# Patient Record
Sex: FEMALE | Race: WHITE | Employment: OTHER | URBAN - METROPOLITAN AREA
[De-identification: names, ages, dates, MRNs, and addresses within clinical notes are randomized per-mention and may not be internally consistent; named-entity substitution may affect disease eponyms.]

---

## 2017-11-13 ENCOUNTER — HOSPITAL ENCOUNTER (OUTPATIENT)
Dept: MAMMOGRAPHY | Age: 67
Discharge: HOME | End: 2017-11-13
Payer: MEDICARE

## 2017-11-13 DIAGNOSIS — Z12.31 ENCOUNTER FOR SCREENING MAMMOGRAM FOR BREAST CANCER: ICD-10-CM

## 2017-11-13 DIAGNOSIS — Z78.0 POST-MENOPAUSAL: ICD-10-CM

## 2017-11-13 PROCEDURE — 77080 DXA BONE DENSITY AXIAL: CPT

## 2017-11-13 PROCEDURE — 77063 BREAST TOMOSYNTHESIS BI: CPT

## 2018-11-14 ENCOUNTER — HOSPITAL ENCOUNTER (OUTPATIENT)
Dept: MAMMOGRAPHY | Age: 68
Discharge: HOME | End: 2018-11-16
Payer: COMMERCIAL

## 2018-11-14 DIAGNOSIS — Z12.31 ENCOUNTER FOR SCREENING MAMMOGRAM FOR BREAST CANCER: ICD-10-CM

## 2018-11-14 PROCEDURE — 77063 BREAST TOMOSYNTHESIS BI: CPT

## 2018-11-28 ENCOUNTER — OFFICE VISIT (OUTPATIENT)
Dept: ORTHOPEDIC SURGERY | Age: 68
End: 2018-11-28
Payer: COMMERCIAL

## 2018-11-28 VITALS — WEIGHT: 140.6 LBS | HEIGHT: 61 IN | BODY MASS INDEX: 26.55 KG/M2

## 2018-11-28 DIAGNOSIS — M47.12 CERVICAL ARTHRITIS WITH MYELOPATHY: Primary | ICD-10-CM

## 2018-11-28 DIAGNOSIS — R26.81 GAIT INSTABILITY: ICD-10-CM

## 2018-11-28 PROCEDURE — 3017F COLORECTAL CA SCREEN DOC REV: CPT | Performed by: STUDENT IN AN ORGANIZED HEALTH CARE EDUCATION/TRAINING PROGRAM

## 2018-11-28 PROCEDURE — 1101F PT FALLS ASSESS-DOCD LE1/YR: CPT | Performed by: STUDENT IN AN ORGANIZED HEALTH CARE EDUCATION/TRAINING PROGRAM

## 2018-11-28 PROCEDURE — 1090F PRES/ABSN URINE INCON ASSESS: CPT | Performed by: STUDENT IN AN ORGANIZED HEALTH CARE EDUCATION/TRAINING PROGRAM

## 2018-11-28 PROCEDURE — 4040F PNEUMOC VAC/ADMIN/RCVD: CPT | Performed by: STUDENT IN AN ORGANIZED HEALTH CARE EDUCATION/TRAINING PROGRAM

## 2018-11-28 PROCEDURE — G8419 CALC BMI OUT NRM PARAM NOF/U: HCPCS | Performed by: STUDENT IN AN ORGANIZED HEALTH CARE EDUCATION/TRAINING PROGRAM

## 2018-11-28 PROCEDURE — 1123F ACP DISCUSS/DSCN MKR DOCD: CPT | Performed by: STUDENT IN AN ORGANIZED HEALTH CARE EDUCATION/TRAINING PROGRAM

## 2018-11-28 PROCEDURE — G8484 FLU IMMUNIZE NO ADMIN: HCPCS | Performed by: STUDENT IN AN ORGANIZED HEALTH CARE EDUCATION/TRAINING PROGRAM

## 2018-11-28 PROCEDURE — 1036F TOBACCO NON-USER: CPT | Performed by: STUDENT IN AN ORGANIZED HEALTH CARE EDUCATION/TRAINING PROGRAM

## 2018-11-28 PROCEDURE — G8399 PT W/DXA RESULTS DOCUMENT: HCPCS | Performed by: STUDENT IN AN ORGANIZED HEALTH CARE EDUCATION/TRAINING PROGRAM

## 2018-11-28 PROCEDURE — G8427 DOCREV CUR MEDS BY ELIG CLIN: HCPCS | Performed by: STUDENT IN AN ORGANIZED HEALTH CARE EDUCATION/TRAINING PROGRAM

## 2018-11-28 PROCEDURE — 99204 OFFICE O/P NEW MOD 45 MIN: CPT | Performed by: STUDENT IN AN ORGANIZED HEALTH CARE EDUCATION/TRAINING PROGRAM

## 2018-11-28 RX ORDER — MAGNESIUM OXIDE 400 MG/1
400 TABLET ORAL DAILY
COMMUNITY

## 2018-11-28 RX ORDER — ATENOLOL 50 MG/1
50 TABLET ORAL DAILY
COMMUNITY

## 2018-11-28 RX ORDER — LISINOPRIL 2.5 MG/1
2.5 TABLET ORAL DAILY
COMMUNITY

## 2018-11-28 RX ORDER — ATORVASTATIN CALCIUM 40 MG/1
40 TABLET, FILM COATED ORAL DAILY
COMMUNITY

## 2018-11-28 ASSESSMENT — ENCOUNTER SYMPTOMS
CHEST TIGHTNESS: 0
DIARRHEA: 0
EYE DISCHARGE: 0
CHOKING: 0
NAUSEA: 0
ABDOMINAL PAIN: 0
VOMITING: 0
WHEEZING: 0

## 2018-12-04 ENCOUNTER — TELEPHONE (OUTPATIENT)
Dept: ORTHOPEDIC SURGERY | Age: 68
End: 2018-12-04

## 2018-12-07 ENCOUNTER — HOSPITAL ENCOUNTER (OUTPATIENT)
Dept: MRI IMAGING | Age: 68
Discharge: HOME | End: 2018-12-09
Payer: COMMERCIAL

## 2018-12-07 DIAGNOSIS — M47.12 CERVICAL ARTHRITIS WITH MYELOPATHY: ICD-10-CM

## 2018-12-07 PROCEDURE — 72141 MRI NECK SPINE W/O DYE: CPT

## 2018-12-26 ENCOUNTER — OFFICE VISIT (OUTPATIENT)
Dept: ORTHOPEDIC SURGERY | Age: 68
End: 2018-12-26
Payer: COMMERCIAL

## 2018-12-26 VITALS — BODY MASS INDEX: 26.56 KG/M2 | WEIGHT: 140.65 LBS | HEIGHT: 61 IN

## 2018-12-26 DIAGNOSIS — M47.12 CERVICAL ARTHRITIS WITH MYELOPATHY: Primary | ICD-10-CM

## 2018-12-26 PROCEDURE — G8484 FLU IMMUNIZE NO ADMIN: HCPCS | Performed by: STUDENT IN AN ORGANIZED HEALTH CARE EDUCATION/TRAINING PROGRAM

## 2018-12-26 PROCEDURE — G8419 CALC BMI OUT NRM PARAM NOF/U: HCPCS | Performed by: STUDENT IN AN ORGANIZED HEALTH CARE EDUCATION/TRAINING PROGRAM

## 2018-12-26 PROCEDURE — 1036F TOBACCO NON-USER: CPT | Performed by: STUDENT IN AN ORGANIZED HEALTH CARE EDUCATION/TRAINING PROGRAM

## 2018-12-26 PROCEDURE — 3017F COLORECTAL CA SCREEN DOC REV: CPT | Performed by: STUDENT IN AN ORGANIZED HEALTH CARE EDUCATION/TRAINING PROGRAM

## 2018-12-26 PROCEDURE — G8427 DOCREV CUR MEDS BY ELIG CLIN: HCPCS | Performed by: STUDENT IN AN ORGANIZED HEALTH CARE EDUCATION/TRAINING PROGRAM

## 2018-12-26 PROCEDURE — 1090F PRES/ABSN URINE INCON ASSESS: CPT | Performed by: STUDENT IN AN ORGANIZED HEALTH CARE EDUCATION/TRAINING PROGRAM

## 2018-12-26 PROCEDURE — 1101F PT FALLS ASSESS-DOCD LE1/YR: CPT | Performed by: STUDENT IN AN ORGANIZED HEALTH CARE EDUCATION/TRAINING PROGRAM

## 2018-12-26 PROCEDURE — 1123F ACP DISCUSS/DSCN MKR DOCD: CPT | Performed by: STUDENT IN AN ORGANIZED HEALTH CARE EDUCATION/TRAINING PROGRAM

## 2018-12-26 PROCEDURE — 99213 OFFICE O/P EST LOW 20 MIN: CPT | Performed by: STUDENT IN AN ORGANIZED HEALTH CARE EDUCATION/TRAINING PROGRAM

## 2018-12-26 PROCEDURE — G8399 PT W/DXA RESULTS DOCUMENT: HCPCS | Performed by: STUDENT IN AN ORGANIZED HEALTH CARE EDUCATION/TRAINING PROGRAM

## 2018-12-26 PROCEDURE — 4040F PNEUMOC VAC/ADMIN/RCVD: CPT | Performed by: STUDENT IN AN ORGANIZED HEALTH CARE EDUCATION/TRAINING PROGRAM

## 2018-12-26 ASSESSMENT — ENCOUNTER SYMPTOMS
WHEEZING: 0
ABDOMINAL DISTENTION: 0
SHORTNESS OF BREATH: 0

## 2018-12-26 NOTE — PROGRESS NOTES
stenosis. Gary Tong is mild right   and moderate to severe left foraminal narrowing.       C5-C6: There is a disc osteophyte complex with uncovertebral and facet   hypertrophy.  There is canal stenosis measuring 8 mm in AP dimension.  There   is moderate to severe left and severe right foraminal narrowing.       C6-C7: There is a disc osteophyte complex with uncovertebral and facet   hypertrophy.  There is canal stenosis measuring 8 mm in AP dimension.  There   is moderate left and moderate to severe right foraminal narrowing.       C7-T1: There is no significant disc protrusion, spinal canal stenosis or   neural foraminal narrowing.           Impression   Multilevel degenerative disc disease with associated uncovertebral and facet   hypertrophy resulting in canal stenosis most pronounced at C5-6 and C6-7.       Mild-to-moderate left foraminal narrowing at C3-4, mild right and moderate to   severe left foraminal narrowing at C4-5, moderate to severe left and severe   right foraminal narrowing at C5-6, moderate left and moderate to severe right   foraminal narrowing at C6-7.               Assessment:      1. Cervical arthritis with myelopathy       Plan:     The MRI was reviewed with the patient. MRI demonstrated multilevel degenerative changes with canal stenosis noted at C5-6 and C6-7. Based on the patient's myelopathic symptoms and MRI findings, we would like the patient be evaluated by Dr. Lizzeth Leon. A referral order was placed and given to the patient. If patient experiences any worsening of her symptoms, change in bowel/bladder, or acute weakness then we recommended her to go to the emergency department for evaluation. Otherwise, patient can follow up with us as needed. Follow up:Return if symptoms worsen or fail to improve. No orders of the defined types were placed in this encounter.          Orders Placed This Encounter   Procedures    [facility] [place] Dorie Miguel-

## 2019-01-09 ENCOUNTER — TELEPHONE (OUTPATIENT)
Dept: ADMINISTRATIVE | Age: 69
End: 2019-01-09

## 2019-01-09 DIAGNOSIS — M47.12 CERVICAL ARTHRITIS WITH MYELOPATHY: Primary | ICD-10-CM

## 2019-03-27 ENCOUNTER — OFFICE VISIT (OUTPATIENT)
Dept: NEUROSURGERY | Age: 69
End: 2019-03-27
Payer: COMMERCIAL

## 2019-03-27 VITALS
SYSTOLIC BLOOD PRESSURE: 118 MMHG | HEIGHT: 61 IN | HEART RATE: 61 BPM | DIASTOLIC BLOOD PRESSURE: 77 MMHG | WEIGHT: 139 LBS | BODY MASS INDEX: 26.24 KG/M2 | RESPIRATION RATE: 20 BRPM

## 2019-03-27 DIAGNOSIS — M47.12 CERVICAL SPONDYLOSIS WITH MYELOPATHY: Primary | ICD-10-CM

## 2019-03-27 PROCEDURE — 99203 OFFICE O/P NEW LOW 30 MIN: CPT | Performed by: NEUROLOGICAL SURGERY

## 2019-03-27 PROCEDURE — G8399 PT W/DXA RESULTS DOCUMENT: HCPCS | Performed by: NEUROLOGICAL SURGERY

## 2019-03-27 PROCEDURE — 3017F COLORECTAL CA SCREEN DOC REV: CPT | Performed by: NEUROLOGICAL SURGERY

## 2019-03-27 PROCEDURE — G8419 CALC BMI OUT NRM PARAM NOF/U: HCPCS | Performed by: NEUROLOGICAL SURGERY

## 2019-03-27 PROCEDURE — 1123F ACP DISCUSS/DSCN MKR DOCD: CPT | Performed by: NEUROLOGICAL SURGERY

## 2019-03-27 PROCEDURE — G8484 FLU IMMUNIZE NO ADMIN: HCPCS | Performed by: NEUROLOGICAL SURGERY

## 2019-03-27 PROCEDURE — 4040F PNEUMOC VAC/ADMIN/RCVD: CPT | Performed by: NEUROLOGICAL SURGERY

## 2019-03-27 PROCEDURE — G8427 DOCREV CUR MEDS BY ELIG CLIN: HCPCS | Performed by: NEUROLOGICAL SURGERY

## 2019-03-27 PROCEDURE — 1036F TOBACCO NON-USER: CPT | Performed by: NEUROLOGICAL SURGERY

## 2019-03-27 PROCEDURE — 1090F PRES/ABSN URINE INCON ASSESS: CPT | Performed by: NEUROLOGICAL SURGERY

## 2019-03-27 RX ORDER — ALENDRONATE SODIUM 70 MG/1
70 TABLET ORAL
COMMUNITY

## 2019-06-05 ENCOUNTER — OFFICE VISIT (OUTPATIENT)
Dept: NEUROSURGERY | Age: 69
End: 2019-06-05
Payer: COMMERCIAL

## 2019-06-05 VITALS
SYSTOLIC BLOOD PRESSURE: 107 MMHG | HEART RATE: 61 BPM | BODY MASS INDEX: 26.47 KG/M2 | WEIGHT: 140 LBS | DIASTOLIC BLOOD PRESSURE: 72 MMHG

## 2019-06-05 DIAGNOSIS — M47.12 CERVICAL SPONDYLOSIS WITH MYELOPATHY: Primary | ICD-10-CM

## 2019-06-05 PROCEDURE — 99214 OFFICE O/P EST MOD 30 MIN: CPT | Performed by: PHYSICIAN ASSISTANT

## 2019-06-05 PROCEDURE — 1090F PRES/ABSN URINE INCON ASSESS: CPT | Performed by: PHYSICIAN ASSISTANT

## 2019-06-05 PROCEDURE — 4040F PNEUMOC VAC/ADMIN/RCVD: CPT | Performed by: PHYSICIAN ASSISTANT

## 2019-06-05 PROCEDURE — 1123F ACP DISCUSS/DSCN MKR DOCD: CPT | Performed by: PHYSICIAN ASSISTANT

## 2019-06-05 PROCEDURE — G8419 CALC BMI OUT NRM PARAM NOF/U: HCPCS | Performed by: PHYSICIAN ASSISTANT

## 2019-06-05 PROCEDURE — G8399 PT W/DXA RESULTS DOCUMENT: HCPCS | Performed by: PHYSICIAN ASSISTANT

## 2019-06-05 PROCEDURE — G8427 DOCREV CUR MEDS BY ELIG CLIN: HCPCS | Performed by: PHYSICIAN ASSISTANT

## 2019-06-05 PROCEDURE — 1036F TOBACCO NON-USER: CPT | Performed by: PHYSICIAN ASSISTANT

## 2019-06-05 PROCEDURE — 3017F COLORECTAL CA SCREEN DOC REV: CPT | Performed by: PHYSICIAN ASSISTANT

## 2019-06-17 ENCOUNTER — HOSPITAL ENCOUNTER (OUTPATIENT)
Dept: PHYSICAL THERAPY | Age: 69
Setting detail: THERAPIES SERIES
Discharge: HOME | End: 2019-06-17
Payer: COMMERCIAL

## 2019-06-17 PROCEDURE — 97110 THERAPEUTIC EXERCISES: CPT

## 2019-06-17 PROCEDURE — 97161 PT EVAL LOW COMPLEX 20 MIN: CPT

## 2019-06-17 NOTE — FLOWSHEET NOTE
Nate Fall Risk Assessment    Patient Name:  Kalie Rea  : 1950        Risk Factor Scale  Score   History of Falls [x] Yes  [] No 25  0 25   Secondary Diagnosis [x] Yes  [] No 15  0 15   Ambulatory Aid [] Furniture  [x] Crutches/cane/walker  [] None/bedrest/wheelchair/nurse 30  15  0 15   IV/Heparin Lock [] Yes  [x] No 20  0 0   Gait/Transferring [] Impaired  [x] Weak  [] Normal/bedrest/immobile 20  10  0 10   Mental Status [] Forgets limitations  [x] Oriented to own ability 15  0 0      Total:65     Based on the Assessment score: check the appropriate box. []  No intervention needed   Low =   Score of 0-24    []  Use standard prevention interventions Moderate =  Score of 24-44   [] Give patient handout and discuss fall prevention strategies   [] Establish goal of education for patient/family RE: fall prevention strategies    [x]  Use high risk prevention interventions High = Score of 45 and higher   [x] Give patient handout and discuss fall prevention strategies   [x] Establish goal of education for patient/family Re: fall prevention strategies   [x] Discuss lifeline / other resources    Electronically signed by:    Alverda Fabry, PT  Date: 2019

## 2019-06-17 NOTE — PRE-CERTIFICATION NOTE
Medicare Cap   [] Physical Therapy  [] Speech Therapy  [] Occupational therapy  *PT and Speech caps combine      $2040 Cap limit < kx modifier needed        Patient Name: Lori Izaguirre  YOB: 1950    Note:  This is an estimate of charges billed.      Date of [address] Name # units/ charge $$$ charge Daily Total Charge Ongoing Total $$$      6/17/2019  PT eval 1    $82.73       there ex  2     $46.34                $129.07     $129.07

## 2019-06-17 NOTE — FLOWSHEET NOTE
[x] [facility] [address]  [address]  [phone]  F: [phone]     Physical Therapy Spine Evaluation    Date:  2019  Patient: Nicole Jay  : 1950  MRN: [id number]  Physician: Paulina Stout,--PA   Insurance: [employer] Dual Benefits  Medical Diagnosis:  Cervical spondylosis with myelopathy (M47.12)  Rehab Codes: cervicalgia (M54.2); muscle weakness (M62.81);abnormal posture (R29.3); spasms of muscle (M62.838); abnormality of gait (R26.89); stiffness of the cervical spine (M43.6)  Onset Date: 10/2018  Next 's appt.: 2019    Subjective:   CC:neck pain, problem walking with balance  HPI: (onset date) no precipitating incident sudden pain in the cervical area - lasted several days until medication started working    PMHx: [] Unremarkable [] Diabetes [] HTN  [] Pacemaker   [] MI/Heart Problems [] Cancer [] Arthritis [] Other:              [] Refer to full medical chart  In EPIC   Tests: [] X-Ray: [x] MRI:  [] Other:    Medications: [x] Refer to full medical record [] None [] Other:  Allergies:      [x] Refer to full medical record [] None [] Other:    Function:  Hand Dominance  [x] Right  [] Left  Working:  [] Normal Duty  [] Light Duty  [] Off D/T Condition  [x] Retired [address] work   [] Not Employed                  []  Disability  [] Other:           Return to work:   Job/ADL Description: hobbies; reading, word puzzles, go shopping; goes with sister grocery shopping  Pain:  [x] Yes  [] No Location: neck - all the way down the upper back Pain Rating: (0-10 scale) 0/10 (today); 2-3/10 most days  Pain altered Tx:  [] Yes  [] No  Action:  Symptoms:  [] Improving [] Worsening [x] Same  Better:  [] AM    [] PM    [] Sit    [] Rise/Sit    []Stand    [] Walk    [] Lying    [] Other:  Worse: [] AM    [] PM    [] Sit    [] Rise/Sit    []Stand    [] Walk    [] Lying    [] Bend                             [] Valsalva    [x] Other:cold environment; cannot sit tooo long, has to change positions frequently  Sleep: [] OK    [] Disturbed    Objective:      ROM  STRENGTH  ROM    Left Right  Left Right Cervical    C5 Shld Abd 140 ° 175 ° L1-2 Hip Flex   Flexion 65 °   Shld Flexion 140 ° 175 ° Hip Abd   Extension 20 °   Shld IR 85 ° 85 ° L3-4 Knee Ext   Rotation L   55 ° R   55 °   Shld ER 85 ° 85 ,° L4 Ankle DF   Sidebend L  45 ° R   55 °   C6 Elb Flex   L5 EHL   Retraction    C7 Elb Ext   S1 Plant. Flex   Lumbar    C8 EPL   Cervical flexion 4+/5  Flexion    T1 Fing Abd    cervical extesion 4-/5  Extension       shld abd 5-/5 5-/5 Rotation L  R      Shoulder IR 4/5 4/5 Sidebend L R      Shoulder ER 4+/5 5/5 UE/LE        shld flex 4+/5 4+/5         Cervi rotation 4/5 4/5         Cervical side bending 4-/5 4/5                                  TESTS (+/-) LEFT RIGHT Not Tested   SLR [] sit [] supine   []   Hamstring (SLR)   []   SKTC   []   DKTC   []   Slump/Dural   []   SI JT   []   TOPHER   []   Joint Mobility   []   Cerv. Comp   [x]   Cerv. Distraction   [x]   Cerv. Alar/Transverse   [x]   Vertebral Artery   [x]   Adsons   []   Tunde Campbell   []   Butch Tests (cervical) ? Pain ?  Pain No Change Not Tested   RFIS [] [] [x] []   CHEYENNE [] [] [x] []   RFIL [] [] [x] []   REIL [] [] [x] []   Rep Prot [] [] [x] []   Rep Retract [] [] [x] []       OBSERVATION No Deficit Deficit Not Tested Comments   Posture       Forward Head [] [x] []    Rounded Shoulders [] [x] []    Kyphosis [] [x] [] Dowager's hump   Lordosis [] [x] [] Decreased lumbar   Lateral Shift [x] [] []    Scoliosis [x] [] []    Iliac Crest [] [] []    PSIS [] [] []    ASIS [] [] []    Genu Valgus [] [] []    Genu Varus [] [] []    Genu Recurvatum [] [] []    Pronation [] [] []    Supination [] [] []    Leg Length Discrp [x] [] []    Slumped Sitting [x] [] []    Palpation [] [x] [] Muscle tightness left upper trapezius   Sensation [x] [] []    Edema [x] [] []    Neurological [x] [] [] FUNCTION Normal Difficult Unable   Sitting [] [] []   Standing [] [] []   Ambulation [] [] []   Groom/Dress [] [] []   Lift/Carry [] [] []   Stairs [] [] []   Bending [] [] []   OH reach [] [] []   Sit to Stand [] [] []     Comments:Neck Disability Index: 44% impairment  Assessment:  Problems:    [x] ? Upper Back Pain:    [x] ? Cervical Pain:    [x] ? ROM: cervical side bending to the left    [x] ? Strength: Cervical and scapular muscle groups  [x] ? Function:Neck Disability Index: 44% impairment  [x] Postural Deviations  [] Gait Deviations  [x] Other:decreased postural awareness  [X] muscle spasms left upper trapezius  [X] history of fall      STG: (to be met in 10 treatments)  1. ? Pain:upper back pain 0/10 with activity  2. Cervical muscle  Pain 0/10  3. ? ROM:cervical side bending to the left 50 °  4. ? Strength:cervical muscle groups 5-/5 or better  5. Strength of scapular muscle groups 4+/5 or better  6. ? Function:Neck Disability Index 30% or less impairment  7. Independent with Home Exercise Programs  8. Demonstrate Knowledge of fall prevention        Patient goals: Pain Management; strengthen arms and legs    Rehab Potential:  [x] Good  [] Fair  [] Poor   Suggested Professional Referral:  [x] No  [] Yes:  Barriers to Goal Achievement:  [x] No  [] Yes:  Domestic Concerns:  [x] No  [] Yes:    Pt. Education:  [x] Plans/Goals, Risks/Benefits discussed  [x] Home exercise program  Method of Education: [x] Verbal  [] Demo  [] Written  Comprehension of Education:  [x] Verbalizes understanding. [] Demonstrates understanding. [] Needs Review. [] Demonstrates/verbalizes understanding of HEP/Ed previously given.     Treatment Plan:  [x] Therapeutic Exercise    [x] Modalities:  [x] Therapeutic Activity    [] Ultrasound  [] Electrical Stimulation  [] Gait Training     []Massage       [] Lumbar/Cervical Traction  [] Neuromuscular Re-education [x] Cold/hotpack [] Iontophoresis: 4 mg/mL  [x] Instruction in HEP             Dexamethasone Sodium  [x] Manual Therapy             Phosphate  mAmin  [] Aquatic Therapy   [] Dry Needling [] Other:    []  Medication allergies reviewed for use of    Dexamethasone Sodium Phosphate 4mg/ml     with iontophoresis treatments. Pt is not allergic. Frequency:  2 x/week for 10 visits    Todays Treatment:  Modalities:   Precautions:patient has osteoporosis and is due for a dexa scan in two months. Patient states she does feel a loss of balance when she arises in the morning and gets out of bed.   Exercises:  Exercise   cervical Reps/ Time Weight/ Level Comments   Shoulder shrug 10 reps  Verbal instruct with demo for home use; written instructions/education issued to the patient   Shoulder roll forward 10 reps  Verbal instruct with demo for home use; written instructions/education issued to the patient   Shoulder roll backward 10 reps  Verbal instruct with demo for home use; written instructions/education issued to the patient   Cervical side bending 5 reps each  Verbal instruct with demo for home use; written instructions/education issued to the patient   Scapular retraction 10 reps  Verbal instruct with demo for home use; written instructions/education issued to the patient         Other:maximum cueing during exercises  Therapeutic activity: reading posture sitting posture    Specific Instructions for next treatment: monitor balance during exercises; isometrics for cervical muscle groups; stretching and strengthening for the upper back ; postural education - work on left upper trapezius spasm if relaxation exercises don't work for patient      Evaluation Complexity:  History (Personal factors, comorbidities) [] 0 [] 1-2 [x] 3+   Exam (limitations, restrictions) [] 1-2 [] 3 [x] 4+   Clinical presentation (progression) [x] Stable [] Evolving  [] Unstable   Decision Making [x] Low [] Moderate [] High    [x] Low Complexity [] Moderate Complexity [] High Complexity       Treatment Charges: Mins Units   [x] Evaluation       [x]  Low       []  Moderate       []  High 24 1   []  Modalities     [x]  Ther Exercise 25 2   []  Manual Therapy     [x]  Ther Activities 3 0   []  Aquatics     []  Vasocompression     []  Other       TOTAL TREATMENT TIME: 52    Time in: 0073      Time out: 1050    Electronically signed by: Tarsha Kapoor PT        Physician Signature:________________________________Date:__________________  By signing above or cosigning this note, I have reviewed this plan of care and certify a need for medically necessary rehabilitation services.      *PLEASE SIGN ABOVE AND FAX BACK ALL PAGES*

## 2019-06-19 ENCOUNTER — HOSPITAL ENCOUNTER (OUTPATIENT)
Dept: PHYSICAL THERAPY | Age: 69
Setting detail: THERAPIES SERIES
Discharge: HOME | End: 2019-06-19
Payer: COMMERCIAL

## 2019-06-19 PROCEDURE — 97110 THERAPEUTIC EXERCISES: CPT

## 2019-06-19 PROCEDURE — 97530 THERAPEUTIC ACTIVITIES: CPT

## 2019-06-19 NOTE — FLOWSHEET NOTE
[x] [facility]  [address]  [phone]  F: [phone]     Physical Therapy Daily Treatment Note    Date:  2019  Patient Name:  Lyudmila Roberts      :  1950    MRN: [id number]  Physician: Katerine Stout,--PA                                  Insurance: [employer] Dual Benefits  Medical Diagnosis:  Cervical spondylosis with myelopathy (M47.12)                        Rehab Codes: cervicalgia (M54.2); muscle weakness (M62.81);abnormal posture (R29.3); spasms of muscle (M62.838); abnormality of gait (R26.89); stiffness of the cervical spine (M43.6)  Onset Date: 10/2018              Next 's appt.: 2019    Visit# / total visits: 2/10  Cancels/No Shows: 0/0    Subjective:    Pain:  [] Yes  [x] No Location: posterior c-spine, B UT's   Pain Rating: (0-10 scale) 0/10  Pain altered Tx:  [] No  [] Yes  Action:  Comments: Patient states she has no pain coming into clinic this date. Objective:  Modalities:   Precautions:patient has osteoporosis and is due for a dexa scan in two months. Patient states she does feel a loss of balance when she arises in the morning and gets out of bed. Exercises:  Exercise Reps/ Time Weight/ Level Comments   Seated      Shoulder Shrugs 10x     Retro Shoulder Rolls 10x     Scapular Retraction 10x5\"     Cervical Side Bending 5x10\" ea     Cervical Flexion 5x10\"           Tband:      - Horizontal Abduction 10x Yellow Bilaterally - simultaneously   - Bicep Curls 10x Yellow Bilaterally - simultaneously   - ER 10x Yellow Bilaterally - simultaneously         Supine      Cervical Retraction 10x5\"     Cane:      - Shoulder Flexion 10x     - Chest Press 10x     - Horizontal Abduction/Adduction 10x     Other: baseline /75     Therapeutic Activity: positional changes and time between to allo blood pressure to regulate, time spent obtaining BP readings and giving pt information regarding this.       BP in supine: 128/79 61BPM @1058  BP in Seated immediately after laying supine: 109/64 BPM 60; 5 minutes after sitting 116/65 BPM 58  Standing immediately after 2nd seated readin/69 BPM 66    Specific Instructions for next treatment: monitor balance during exercises (orhotstatic hypo tension? ? ); isometrics for cervical muscle groups; stretching and strengthening for the upper back ; postural education - work on left upper trapezius spasm if relaxation exercises don't work for patient       Treatment Charges: Mins Units   []  Modalities     []  Ther Exercise 35 2   []  Manual Therapy     [x]  Ther Activities 10 1   []  Aquatics     []  Vasocompression     []  Other     Total Treatment time 45 3       Assessment: [] Progressing toward goals:Great tolerance to Tx this date. Difficulty mobilizing L UE due to RTC weakness, but otherwise had no reports of pain with Tx. Blood pressures reading as noted above, supine to seated, gives light evidence of hypotension, will continue to monitor this in clinic and throughout patient self reports of ADL's.     [] No change. [x] Other: https://jaramillo.[email or web address] pdf (handout for hypotension)     STG: (to be met in 10 treatments)  1. ? Pain:upper back pain 0/10 with activity  2. Cervical muscle  Pain 0/10  3. ? ROM:cervical side bending to the left 50 °  4. ? Strength:cervical muscle groups 5-/5 or better  5. Strength of scapular muscle groups 4+/5 or better  6. ? Function:Neck Disability Index 30% or less impairment  7. Independent with Home Exercise Programs  8. Demonstrate Knowledge of fall prevention    Patient goals: Pain Management; strengthen arms and legs      Pt. Education:  [x] Yes  [] No  [x] Reviewed Prior HEP/Ed  Method of Education: [x] Verbal  [x] Demo  [x] Written  Comprehension of Education:  [x] Verbalizes understanding. [] Demonstrates understanding. [x] Needs review.   [] Demonstrates/verbalizes HEP/Ed previously given.     Plan: [x] Continue per plan of care.    [] Other:   Frequency:  2 x/week for 10 visits        Time In: 5515           Time Out: 1120    Electronically signed by:  Roselyn Trevino PTA

## 2019-06-24 ENCOUNTER — HOSPITAL ENCOUNTER (OUTPATIENT)
Dept: PHYSICAL THERAPY | Age: 69
Setting detail: THERAPIES SERIES
Discharge: HOME | End: 2019-06-24
Payer: COMMERCIAL

## 2019-06-24 VITALS — DIASTOLIC BLOOD PRESSURE: 74 MMHG | HEART RATE: 63 BPM | SYSTOLIC BLOOD PRESSURE: 120 MMHG

## 2019-06-24 PROCEDURE — 97110 THERAPEUTIC EXERCISES: CPT

## 2019-06-26 ENCOUNTER — HOSPITAL ENCOUNTER (OUTPATIENT)
Dept: PHYSICAL THERAPY | Age: 69
Setting detail: THERAPIES SERIES
Discharge: HOME | End: 2019-06-26
Payer: COMMERCIAL

## 2019-06-26 PROCEDURE — 97110 THERAPEUTIC EXERCISES: CPT

## 2019-07-01 ENCOUNTER — HOSPITAL ENCOUNTER (OUTPATIENT)
Dept: PHYSICAL THERAPY | Age: 69
Setting detail: THERAPIES SERIES
Discharge: HOME | End: 2019-07-01
Payer: COMMERCIAL

## 2019-07-01 PROCEDURE — 97110 THERAPEUTIC EXERCISES: CPT

## 2019-07-01 NOTE — FLOWSHEET NOTE
[x] [facility]  [address]  [phone]  F: [phone]     Physical Therapy Daily Treatment Note    Date:  2019  Patient Name:  Gabrielle Guardado      :  1950    MRN: [id number]  Physician: Susan Stout,--PA                                  Insurance: [employer] Dual Benefits (authorization needed for visits greater than 12)  Medical Diagnosis:  Cervical spondylosis with myelopathy (M47.12)                        Rehab Codes: cervicalgia (M54.2); muscle weakness (M62.81);abnormal posture (R29.3); spasms of muscle (M62.838); abnormality of gait (R26.89); stiffness of the cervical spine (M43.6)  Onset Date: 10/2018              Next 's appt.: 2019    Visit# / total visits: 5/10  Cancels/No Shows: 0/0    Subjective:    Pain:  [] Yes  [x] No Location: posterior c-spine, B UT's   Pain Rating: (0-10 scale) 0/10  Pain altered Tx:  [x] No  [] Yes  Action:  Comments: patient states that she continues to do well, with pain only coming intermittently. Objective:  Modalities:   Precautions: patient has osteoporosis and is due for a dexa scan in two months. Patient states she does feel a loss of balance when she arises in the morning and gets out of bed.   Exercises:  Exercise Reps/ Time Weight/ Level Comments   Pulleys 2/2 min  Flexion abduction         Seated      Shoulder Shrugs 15x     Cervical ROM 5x10\" ea     UT Stretch 3x30\" ea  Holding EOM         Standing      Tband:      - Pull Down 15x [place]    - Tricep Press 15x [place]    - Rows 2x10 [place]    - Horizontal Abduction 15x [place] Bilaterally - simultaneously   - Bicep Curls 15x [place] Bilaterally - simultaneously   - ER 15x [place] Bilaterally - simultaneously         [employer] on wall  10x Green ball W/cervical extension to decrease flexed position   Cane Extension 15x 2 lb    Cane Abduction 15x 2 lb          Supine      Cervical Retraction 10x5\"

## 2019-07-03 ENCOUNTER — HOSPITAL ENCOUNTER (OUTPATIENT)
Dept: PHYSICAL THERAPY | Age: 69
Setting detail: THERAPIES SERIES
Discharge: HOME | End: 2019-07-03
Payer: COMMERCIAL

## 2019-07-03 PROCEDURE — 97110 THERAPEUTIC EXERCISES: CPT

## 2019-07-03 NOTE — FLOWSHEET NOTE
[x] [facility] [address]  [address]  [phone]  F: [phone]     Physical Therapy Daily Treatment Note    Date:  7/3/2019  Patient Name:  Blanca Edwards      :  1950    MRN: [id number]  Physician: Cayden Stout,--PA                                  Insurance: [facility] Dual Benefits (authorization needed for visits greater than 12)  Medical Diagnosis:  Cervical spondylosis with myelopathy (M47.12)                        Rehab Codes: cervicalgia (M54.2); muscle weakness (M62.81);abnormal posture (R29.3); spasms of muscle (M62.838); abnormality of gait (R26.89); stiffness of the cervical spine (M43.6)  Onset Date: 10/2018              Next 's appt.: 2019    Visit# / total visits: 6/10  Cancels/No Shows: 0/0    Subjective:    Pain:  [] Yes  [x] No Location: posterior c-spine, B UT's   Pain Rating: (0-10 scale) 0/10  Pain altered Tx:  [x] No  [] Yes  Action:  Comments: patient states she continues to do well, no pain today. Objective:  Modalities:   Precautions: patient has osteoporosis and is due for a dexa scan in two months. Patient states she does feel a loss of balance when she arises in the morning and gets out of bed.   Exercises:  Exercise Reps/ Time Weight/ Level Comments   Pulleys 2/2 min  Flexion abduction         Seated      Cervical ROM 5x10\" ea HEP    UT Stretch 3x30\" ea  Holding EOM/chair         Standing      Shoulder Shrugs 15x 2 lb Progressed from sitting 7/3/19   FW Flexion 10x 1 lb    FW Abduction 10x 1 lb          Tband:      - Pull Down 15x [place]    - Tricep Press 15x [place]    - Rows 2x10 [place]    - Horizontal Abduction 15x [place] Bilaterally - simultaneously   - Bicep Curls 15x [place] Bilaterally - simultaneously   - ER 15x [place] Bilaterally - simultaneously         Ball roll on wall  10x Green ball W/cervical extension to decrease flexed position   Cane Extension 15x 2 lb    [employer]

## 2019-07-08 ENCOUNTER — HOSPITAL ENCOUNTER (OUTPATIENT)
Dept: PHYSICAL THERAPY | Age: 69
Setting detail: THERAPIES SERIES
Discharge: HOME | End: 2019-07-08
Payer: COMMERCIAL

## 2019-07-08 PROCEDURE — 97110 THERAPEUTIC EXERCISES: CPT

## 2019-07-10 ENCOUNTER — HOSPITAL ENCOUNTER (OUTPATIENT)
Dept: PHYSICAL THERAPY | Age: 69
Setting detail: THERAPIES SERIES
Discharge: HOME | End: 2019-07-10
Payer: COMMERCIAL

## 2019-07-10 PROCEDURE — 97110 THERAPEUTIC EXERCISES: CPT

## 2019-07-10 NOTE — FLOWSHEET NOTE
[x] [facility] [address]  [address]  [phone]  F: [phone]     Physical Therapy Daily Treatment Note    Date:  7/10/2019  Patient Name:  Myrna Bush      :  1950    MRN: [id number]  Physician: Nilton Stout,--PA                                  Insurance: [employer] [facility] (authorization needed for visits greater than 12)  Medical Diagnosis:  Cervical spondylosis with myelopathy (M47.12)                        Rehab Codes: cervicalgia (M54.2); muscle weakness (M62.81);abnormal posture (R29.3); spasms of muscle (M62.838); abnormality of gait (R26.89); stiffness of the cervical spine (M43.6)  Onset Date: 10/2018              Next 's appt.: 2019    Visit# / total visits: 8/10  Cancels/No Shows: 0/0    Subjective:    Pain:  [] Yes  [x] No Location: posterior c-spine, B UT's   Pain Rating: (0-10 scale) 0/10  Pain altered Tx:  [x] No  [] Yes  Action:  Comments: Patient states she is feeling pretty good. reports neck and shoulder motions are improving. Objective:  Modalities:   Precautions: patient has osteoporosis and is due for a dexa scan in two months. Patient states she does feel a loss of balance when she arises in the morning and gets out of bed.   Exercises:  Exercise Reps/ Time Weight/ Level Comments   Pulleys 2/2 min  Flexion abduction         Seated      Cervical ROM 5x10\" ea HEP    UT Stretch 3x30\" ea  Holding EOM/chair         Standing      Shoulder Shrugs 15x 2 lb Progressed from sitting 7/3/19   FW Flexion 15x 1 lb    FW Abduction 10x 1 lb          Tband:      - Pull Down 15x [place]    - Tricep Press 15x [place]    - Rows 2x10 [place]    - Horizontal Abduction 15x [place] Bilaterally - simultaneously   - Bicep Curls 15x [place] Bilaterally - simultaneously   - ER 15x [place] Bilaterally - simultaneously         Ball roll on wall  10x Green ball W/cervical extension to decrease flexed

## 2019-07-15 ENCOUNTER — HOSPITAL ENCOUNTER (OUTPATIENT)
Dept: PHYSICAL THERAPY | Age: 69
Setting detail: THERAPIES SERIES
Discharge: HOME | End: 2019-07-15
Payer: COMMERCIAL

## 2019-07-15 PROCEDURE — 97110 THERAPEUTIC EXERCISES: CPT

## 2019-07-15 NOTE — FLOWSHEET NOTE
Other:   Frequency:  2 x/week for 10 visits        Time In: 7857        Time Out: 0739      Electronically signed by:  Montse Singh PTA

## 2019-07-17 ENCOUNTER — HOSPITAL ENCOUNTER (OUTPATIENT)
Dept: PHYSICAL THERAPY | Age: 69
Setting detail: THERAPIES SERIES
Discharge: HOME | End: 2019-07-17
Payer: COMMERCIAL

## 2019-07-17 PROCEDURE — 97110 THERAPEUTIC EXERCISES: CPT

## 2019-09-11 ENCOUNTER — OFFICE VISIT (OUTPATIENT)
Dept: NEUROSURGERY | Age: 69
End: 2019-09-11
Payer: COMMERCIAL

## 2019-09-11 VITALS — DIASTOLIC BLOOD PRESSURE: 57 MMHG | HEART RATE: 63 BPM | RESPIRATION RATE: 16 BRPM | SYSTOLIC BLOOD PRESSURE: 108 MMHG

## 2019-09-11 DIAGNOSIS — M47.12 CERVICAL SPONDYLOSIS WITH MYELOPATHY: Primary | ICD-10-CM

## 2019-09-11 PROCEDURE — 4040F PNEUMOC VAC/ADMIN/RCVD: CPT | Performed by: NEUROLOGICAL SURGERY

## 2019-09-11 PROCEDURE — 3017F COLORECTAL CA SCREEN DOC REV: CPT | Performed by: NEUROLOGICAL SURGERY

## 2019-09-11 PROCEDURE — G8419 CALC BMI OUT NRM PARAM NOF/U: HCPCS | Performed by: NEUROLOGICAL SURGERY

## 2019-09-11 PROCEDURE — 1123F ACP DISCUSS/DSCN MKR DOCD: CPT | Performed by: NEUROLOGICAL SURGERY

## 2019-09-11 PROCEDURE — G8427 DOCREV CUR MEDS BY ELIG CLIN: HCPCS | Performed by: NEUROLOGICAL SURGERY

## 2019-09-11 PROCEDURE — 99213 OFFICE O/P EST LOW 20 MIN: CPT | Performed by: NEUROLOGICAL SURGERY

## 2019-09-11 PROCEDURE — 1036F TOBACCO NON-USER: CPT | Performed by: NEUROLOGICAL SURGERY

## 2019-09-11 PROCEDURE — G8399 PT W/DXA RESULTS DOCUMENT: HCPCS | Performed by: NEUROLOGICAL SURGERY

## 2019-09-11 PROCEDURE — 1090F PRES/ABSN URINE INCON ASSESS: CPT | Performed by: NEUROLOGICAL SURGERY

## 2019-09-11 NOTE — PROGRESS NOTES
[facility] [address]  [address]  [facility] [place]  [address] [place] [id number]  Dept: [phone]    Patient:  Rigoberto Gowers  YOB: 1950  Date: 9/11/19    The patient is a 76 y.o. female who presents today for consult of the following problems:     Chief Complaint   Patient presents with    Numbness             HPI:     Rigoberto Gowers is a 76 y.o. female on whom neurosurgical consultation was requested by Karen Alexander NPLaurenC, TAMIA - NP for management of myelopathy. States that she has actually had dramatic improvement in ambulation, hand function and coordination with therapy. No difficulty with dropping things, buttoning/feeding/zipper/writing or fall. Michelle Shell History:     No past medical history on file. No past surgical history on file. No family history on file. Current Outpatient Medications on File Prior to Visit   Medication Sig Dispense Refill    alendronate (FOSAMAX) 70 MG tablet Take 70 mg by mouth every 7 days      metFORMIN (GLUCOPHAGE) 500 MG tablet Take 500 mg by mouth 2 times daily (with meals)      atenolol (TENORMIN) 50 MG tablet Take 50 mg by mouth daily      atorvastatin (LIPITOR) 40 MG tablet Take 40 mg by mouth daily      lisinopril (PRINIVIL;ZESTRIL) 2.5 MG tablet Take 2.5 mg by mouth daily      magnesium oxide (MAG-OX) 400 MG tablet Take 400 mg by mouth daily       No current facility-administered medications on file prior to visit. Social History     Tobacco Use    Smoking status: Never Smoker    Smokeless tobacco: Never Used   Substance Use Topics    Alcohol use: No    Drug use: No       Allergies   Allergen Reactions    Penicillins        Review of Systems  Constitutional: Negative for activity change and appetite change. HENT: Negative for ear pain and facial swelling. Eyes: Negative for discharge and itching. Respiratory: Negative for choking and chest tightness.     Cardiovascular: Negative for chest pain

## 2019-11-18 ENCOUNTER — HOSPITAL ENCOUNTER (OUTPATIENT)
Dept: MAMMOGRAPHY | Age: 69
Discharge: HOME | End: 2019-11-20
Payer: COMMERCIAL

## 2019-11-18 DIAGNOSIS — Z13.820 SCREENING FOR OSTEOPOROSIS: ICD-10-CM

## 2019-11-18 DIAGNOSIS — Z12.31 ENCOUNTER FOR SCREENING MAMMOGRAM FOR BREAST CANCER: ICD-10-CM

## 2019-11-18 PROCEDURE — 77080 DXA BONE DENSITY AXIAL: CPT

## 2019-11-18 PROCEDURE — 77063 BREAST TOMOSYNTHESIS BI: CPT

## 2020-11-20 ENCOUNTER — HOSPITAL ENCOUNTER (OUTPATIENT)
Dept: MAMMOGRAPHY | Age: 70
Discharge: HOME | End: 2020-11-22
Payer: COMMERCIAL

## 2020-11-20 PROCEDURE — 77063 BREAST TOMOSYNTHESIS BI: CPT

## 2022-05-13 ENCOUNTER — HOSPITAL ENCOUNTER (OUTPATIENT)
Dept: MAMMOGRAPHY | Age: 72
Discharge: HOME | End: 2022-05-15
Payer: COMMERCIAL

## 2022-05-13 DIAGNOSIS — M81.0 OSTEOPOROSIS, UNSPECIFIED OSTEOPOROSIS TYPE, UNSPECIFIED PATHOLOGICAL FRACTURE PRESENCE: ICD-10-CM

## 2022-05-13 DIAGNOSIS — Z12.31 SCREENING MAMMOGRAM FOR BREAST CANCER: ICD-10-CM

## 2022-05-13 PROCEDURE — 77080 DXA BONE DENSITY AXIAL: CPT

## 2022-05-13 PROCEDURE — 77063 BREAST TOMOSYNTHESIS BI: CPT

## 2023-05-15 ENCOUNTER — HOSPITAL ENCOUNTER (OUTPATIENT)
Dept: MAMMOGRAPHY | Age: 73
Discharge: HOME | End: 2023-05-17
Payer: COMMERCIAL

## 2023-05-15 DIAGNOSIS — Z12.31 ENCOUNTER FOR SCREENING MAMMOGRAM FOR BREAST CANCER: ICD-10-CM

## 2023-05-15 PROCEDURE — 77063 BREAST TOMOSYNTHESIS BI: CPT
